# Patient Record
Sex: FEMALE
[De-identification: names, ages, dates, MRNs, and addresses within clinical notes are randomized per-mention and may not be internally consistent; named-entity substitution may affect disease eponyms.]

---

## 2022-11-01 ENCOUNTER — APPOINTMENT (OUTPATIENT)
Dept: OTOLARYNGOLOGY | Facility: CLINIC | Age: 52
End: 2022-11-01

## 2022-11-01 VITALS — HEIGHT: 66 IN | BODY MASS INDEX: 18.96 KG/M2 | TEMPERATURE: 97.4 F | WEIGHT: 118 LBS

## 2022-11-01 DIAGNOSIS — H61.23 IMPACTED CERUMEN, BILATERAL: ICD-10-CM

## 2022-11-01 DIAGNOSIS — Z78.9 OTHER SPECIFIED HEALTH STATUS: ICD-10-CM

## 2022-11-01 DIAGNOSIS — J00 ACUTE NASOPHARYNGITIS [COMMON COLD]: ICD-10-CM

## 2022-11-01 DIAGNOSIS — Z80.42 FAMILY HISTORY OF MALIGNANT NEOPLASM OF PROSTATE: ICD-10-CM

## 2022-11-01 DIAGNOSIS — K80.20 CALCULUS OF GALLBLADDER W/OUT CHOLECYSTITIS W/OUT OBSTRUCTION: ICD-10-CM

## 2022-11-01 DIAGNOSIS — H93.8X3 OTHER SPECIFIED DISORDERS OF EAR, BILATERAL: ICD-10-CM

## 2022-11-01 DIAGNOSIS — Z80.0 FAMILY HISTORY OF MALIGNANT NEOPLASM OF DIGESTIVE ORGANS: ICD-10-CM

## 2022-11-01 PROBLEM — Z00.00 ENCOUNTER FOR PREVENTIVE HEALTH EXAMINATION: Status: ACTIVE | Noted: 2022-11-01

## 2022-11-01 PROCEDURE — 99203 OFFICE O/P NEW LOW 30 MIN: CPT

## 2022-11-01 NOTE — ASSESSMENT
[FreeTextEntry1] : She had cerumen impaction bilaterally which was removed.  She felt better afterwards.  Her ears are otherwise normal on exam.\par \par She has nasal congestion for the past 7 to 10 days.  She does not feel sick.\par \par PLAN\par \par -findings and management options discussed in detail with the patient. \par -good aural hygiene\par -avoid using cotton swabs in the ears\par -wax removal drops as needed. \par -noise precautions\par -She declined an audiogram to check her hearing\par -I recommended trial of a nasal steroid spray and/or decongestant for the nasal congestion.  If it does not improve, I asked her to return for further work-up\par -She is going to be flying soon.  She may use Afrin prior to takeoff and landing.  She may also use a decongestant\par -follow up in 1 year or earlier if needed to check her ears

## 2022-11-01 NOTE — HISTORY OF PRESENT ILLNESS
[de-identified] : STEVE VILLANUEVA is a 52 year old patient referred for cerumen impaction.  The left ear feels more full.  She has no otalgia, otorrhea, tinnitus, or dizziness.  She has had nasal congestion for the past 7 to 10 days.  She does not feel sick.  She has no sinus pain or pressure.  She denies a history of recurrent middle ear infections, prior otologic surgery, ear trauma, or excessive noise exposure.  There is a family history of age-related hearing loss.